# Patient Record
Sex: MALE | Race: BLACK OR AFRICAN AMERICAN | NOT HISPANIC OR LATINO | URBAN - METROPOLITAN AREA
[De-identification: names, ages, dates, MRNs, and addresses within clinical notes are randomized per-mention and may not be internally consistent; named-entity substitution may affect disease eponyms.]

---

## 2023-11-26 ENCOUNTER — EMERGENCY (EMERGENCY)
Facility: HOSPITAL | Age: 28
LOS: 1 days | Discharge: HOME | End: 2023-11-26
Attending: STUDENT IN AN ORGANIZED HEALTH CARE EDUCATION/TRAINING PROGRAM
Payer: COMMERCIAL

## 2023-11-26 VITALS
HEART RATE: 59 BPM | DIASTOLIC BLOOD PRESSURE: 85 MMHG | WEIGHT: 210.1 LBS | OXYGEN SATURATION: 99 % | HEIGHT: 70 IN | TEMPERATURE: 98 F | SYSTOLIC BLOOD PRESSURE: 121 MMHG | RESPIRATION RATE: 18 BRPM

## 2023-11-26 VITALS
HEART RATE: 72 BPM | OXYGEN SATURATION: 98 % | RESPIRATION RATE: 20 BRPM | DIASTOLIC BLOOD PRESSURE: 80 MMHG | SYSTOLIC BLOOD PRESSURE: 126 MMHG | TEMPERATURE: 98 F

## 2023-11-26 PROCEDURE — 26600 TREAT METACARPAL FRACTURE: CPT | Mod: LT

## 2023-11-26 PROCEDURE — 99284 EMERGENCY DEPT VISIT MOD MDM: CPT | Mod: 25

## 2023-11-26 PROCEDURE — 26641 TREAT THUMB DISLOCATION: CPT | Mod: LT

## 2023-11-26 PROCEDURE — 73110 X-RAY EXAM OF WRIST: CPT | Mod: 26,LT

## 2023-11-26 PROCEDURE — 73130 X-RAY EXAM OF HAND: CPT | Mod: 26,LT

## 2023-11-26 PROCEDURE — 73200 CT UPPER EXTREMITY W/O DYE: CPT | Mod: MA

## 2023-11-26 PROCEDURE — 99285 EMERGENCY DEPT VISIT HI MDM: CPT | Mod: 25

## 2023-11-26 PROCEDURE — 99284 EMERGENCY DEPT VISIT MOD MDM: CPT

## 2023-11-26 PROCEDURE — 99285 EMERGENCY DEPT VISIT HI MDM: CPT

## 2023-11-26 PROCEDURE — 73110 X-RAY EXAM OF WRIST: CPT

## 2023-11-26 PROCEDURE — 73130 X-RAY EXAM OF HAND: CPT

## 2023-11-26 PROCEDURE — 73200 CT UPPER EXTREMITY W/O DYE: CPT | Mod: 26,LT,MA

## 2023-11-26 RX ORDER — IBUPROFEN 200 MG
600 TABLET ORAL ONCE
Refills: 0 | Status: COMPLETED | OUTPATIENT
Start: 2023-11-26 | End: 2023-11-26

## 2023-11-26 RX ADMIN — Medication 600 MILLIGRAM(S): at 15:06

## 2023-11-26 NOTE — ED ADULT NURSE NOTE - NSFALLUNIVINTERV_ED_ALL_ED
Bed/Stretcher in lowest position, wheels locked, appropriate side rails in place/Call bell, personal items and telephone in reach/Instruct patient to call for assistance before getting out of bed/chair/stretcher/Non-slip footwear applied when patient is off stretcher/[place] to call system/Physically safe environment - no spills, clutter or unnecessary equipment/Purposeful proactive rounding/Room/bathroom lighting operational, light cord in reach

## 2023-11-26 NOTE — ED PROVIDER NOTE - PATIENT PORTAL LINK FT
You can access the FollowMyHealth Patient Portal offered by [facility] by registering at the following website: [email or web address]. By joining [employer]’s FollowMyHealth portal, you will also be able to view your health information using other applications (apps) compatible with our system.

## 2023-11-26 NOTE — ED PROVIDER NOTE - CARE PROVIDER_API CALL
Manuel Bills  Orthopaedic Surgery  [address] [place] [address]  Phone: [phone]  Fax: [phone]  Follow Up Time:

## 2023-11-26 NOTE — ED PROVIDER NOTE - NS ED ATTENDING STATEMENT MOD
This was a shared visit with the CHERIE. I reviewed and verified the documentation and independently performed the documented:

## 2023-11-26 NOTE — ED PROVIDER NOTE - OBJECTIVE STATEMENT
28-year-old male presents emergency department complaining of left hand pain status post fall onto the left hand yesterday.  Patient is right-handed.  Denies any other complaints of pain.

## 2023-11-26 NOTE — CONSULT NOTE ADULT - NS ATTEND AMEND GEN_ALL_CORE FT
Agree with PA note and plan as written.  Splint and closed reduction of thumb completed and ct reviewed. Patient to follow up as outpatient for definitive plan.

## 2023-11-26 NOTE — ED PROVIDER NOTE - CLINICAL SUMMARY MEDICAL DECISION MAKING FREE TEXT BOX
28-year-old male presents emergency room with left hand pain status post fall.  X-ray shows displaced hamate carpal bone fracture.  Orthopedics was consulted and fracture was reduced.  Patient was placed in splint by orthopedics.  Discussed with Ortho and patient is okay for discharge home with outpatient follow-up.  Importance of follow-up discussed with patient.

## 2023-11-26 NOTE — ED ADULT TRIAGE NOTE - CHIEF COMPLAINT QUOTE
pt bib [facility] for pain and swelling of L hand, pt fell on hand yesterday, pt able to move hand and fingers w/o difficulty.

## 2023-11-26 NOTE — CONSULT NOTE ADULT - SUBJECTIVE AND OBJECTIVE BOX
Pt Name: JOSEFINA AGUILERA    MRN: [id number]      Patient is a 28y Male presenting to the emergency department with a chief complaint of left hand pain. Patient seen at bedside. Patient reports falling and landing onto left hand. After fall patient endorsing left thumb pain. Reports pain and swelling, denies numbness or tingling. No other orthopedic complaints.       REVIEW OF SYSTEMS    General: Alert, responsive, in NAD    Skin: No rashes, no pruritis. + swelling to left first digit/hand.    Musculoskeletal: SEE HPI.    Neurological: No sensory or motor changes.         PAST MEDICAL & SURGICAL HISTORY:  PAST MEDICAL & SURGICAL HISTORY:      Allergies: No Known Allergies      Medications:     FAMILY HISTORY:  : non-contributory    Social History:     Ambulation: Walking independently [x] With Cane [ ] With Walker [ ]  Bedbound [ ]       Vital Signs Last 24 Hrs  T(C): 36.8 (26 Nov 2023 14:26), Max: 36.8 (26 Nov 2023 14:26)  T(F): 98.2 (26 Nov 2023 14:26), Max: 98.2 (26 Nov 2023 14:26)  HR: 59 (26 Nov 2023 14:26) (59 - 59)  BP: 121/85 (26 Nov 2023 14:26) (121/85 - 121/85)  BP(mean): --  RR: 18 (26 Nov 2023 14:26) (18 - 18)  SpO2: 99% (26 Nov 2023 14:26) (99% - 99%)    Parameters below as of 26 Nov 2023 14:26  Patient On (Oxygen Delivery Method): room air        Daily Height in cm: 177.8 (26 Nov 2023 14:26)    Daily       PHYSICAL EXAM:      Appearance: Alert, responsive, in no acute distress.    Neurological: Sensation is grossly intact to light touch. No focal deficits or weaknesses found.    Skin: no rash on visible skin. Skin is clean, dry and intact. No bleeding. No abrasions. No ulcerations. + swelling to left first digit/hand    Vascular: Cap refill < 2 sec. No signs of venous insufficiency or stasis. No extremity ulcerations. No cyanosis.    Musculoskeletal:         Left Upper Extremity: Clavicle: NTTP. Shoulder: NTTP, FROM. Abduction/adduction/flexion/extension intact. Elbow: NTTP, FROM. EE/EF intact. Wrist: NTTP, FROM. WE/WF intact. Hand: NTTP throughout, FROM. Abduction/adduction/flexion/extension of digits 1-5 intact. Compartments soft compressible. Sensation intact to light touch.        Right Upper Extremity: moving extremity freely on exam     Left Lower Extremity: moving extremity freely on exam     Right Lower Extremity: moving extremity freely on exam    Imaging Studies:  < from: Xray Wrist 3 Views, Left (11.26.23 @ 16:02) >  ACC: [id number] EXAM:  XR WRIST COMP MIN 3 VIEWS LT   ORDERED BY: MILLER FIELDS     ACC: [id number] EXAM:  XR HAND MIN 3 VIEWS LT   ORDERED BY: MILLER FIELDS     PROCEDURE DATE:  11/26/2023          INTERPRETATION:  Radiographs of the LEFT hand and wrist    CLINICAL INFORMATION:  LEFT hand and wrist  Pain.    TECHNIQUE:  Frontal, oblique and lateral views of the hand and wrist were   obtained.    FINDINGS:   No prior examinations are available for review.    Transverse displaced fracture through the distal end of hamate carpal   bone with malalignment of the radiocarpal joint space..  Remaining osseous and joint structures of the plate No subcutaneous air   or radiopaque foreign body.      IMPRESSION:   Displaced hamate carpal bone fracture with malalignment of   the radiocarpal joint space.    --- End of Report ---      MILLER PATEL MD; Attending Radiologist  This document has been electronically signed. Nov 26 2023  5:09PM    < end of copied text >      A/P:  Pt is a  28y Male with displaced hamate     PLAN:   - splint applied to LUE  - continue NWB Left hand  - f/u CT scan of left hand/wrist  - final plan pending image results  - d/w Dr. Oconnell    SPLINTING   PROCEDURE NOTE: Splinting    Performed by: Patricia North PA-C     Indication: fracture of hamate with 1st digit dislocation    The left 1st digit was appropriately positioned. A plaster splint was applied. Distally, the extremity was neurovascular intact following the procedure. The patient tolerated the procedure well. Pt Name: JOSEFINA AGUILERA    MRN: [id number]      Patient is a 28y Male presenting to the emergency department with a chief complaint of left hand pain. Patient seen at bedside. Patient reports falling and landing onto left hand. After fall patient endorsing left thumb pain. Reports pain and swelling, denies numbness or tingling. No other orthopedic complaints.       REVIEW OF SYSTEMS    General: Alert, responsive, in NAD    Skin: No rashes, no pruritis. + swelling to left first digit/hand.    Musculoskeletal: SEE HPI.    Neurological: No sensory or motor changes.         PAST MEDICAL & SURGICAL HISTORY:  PAST MEDICAL & SURGICAL HISTORY:      Allergies: No Known Allergies      Medications:     FAMILY HISTORY:  : non-contributory    Social History:     Ambulation: Walking independently [x] With Cane [ ] With Walker [ ]  Bedbound [ ]       Vital Signs Last 24 Hrs  T(C): 36.8 (26 Nov 2023 14:26), Max: 36.8 (26 Nov 2023 14:26)  T(F): 98.2 (26 Nov 2023 14:26), Max: 98.2 (26 Nov 2023 14:26)  HR: 59 (26 Nov 2023 14:26) (59 - 59)  BP: 121/85 (26 Nov 2023 14:26) (121/85 - 121/85)  BP(mean): --  RR: 18 (26 Nov 2023 14:26) (18 - 18)  SpO2: 99% (26 Nov 2023 14:26) (99% - 99%)    Parameters below as of 26 Nov 2023 14:26  Patient On (Oxygen Delivery Method): room air        Daily Height in cm: 177.8 (26 Nov 2023 14:26)    Daily       PHYSICAL EXAM:      Appearance: Alert, responsive, in no acute distress.    Neurological: Sensation is grossly intact to light touch. No focal deficits or weaknesses found.    Skin: no rash on visible skin. Skin is clean, dry and intact. No bleeding. No abrasions. No ulcerations. + swelling to left first digit/hand    Vascular: Cap refill < 2 sec. No signs of venous insufficiency or stasis. No extremity ulcerations. No cyanosis.    Musculoskeletal:         Left Upper Extremity: Clavicle: NTTP. Shoulder: NTTP, FROM. Abduction/adduction/flexion/extension intact. Elbow: NTTP, FROM. EE/EF intact. Wrist: NTTP, FROM. WE/WF intact. Hand: NTTP throughout, FROM. Abduction/adduction/flexion/extension of digits 1-5 intact. Compartments soft compressible. Sensation intact to light touch. Radial pulse 2+       Right Upper Extremity: moving extremity freely on exam     Left Lower Extremity: moving extremity freely on exam     Right Lower Extremity: moving extremity freely on exam    Imaging Studies:  < from: Xray Wrist 3 Views, Left (11.26.23 @ 16:02) >  ACC: [id number] EXAM:  XR WRIST COMP MIN 3 VIEWS LT   ORDERED BY: MILLER FIELDS     ACC: [id number] EXAM:  XR HAND MIN 3 VIEWS LT   ORDERED BY: MILLER FIELDS     PROCEDURE DATE:  11/26/2023          INTERPRETATION:  Radiographs of the LEFT hand and wrist    CLINICAL INFORMATION:  LEFT hand and wrist  Pain.    TECHNIQUE:  Frontal, oblique and lateral views of the hand and wrist were   obtained.    FINDINGS:   No prior examinations are available for review.    Transverse displaced fracture through the distal end of hamate carpal   bone with malalignment of the radiocarpal joint space..  Remaining osseous and joint structures of the plate No subcutaneous air   or radiopaque foreign body.      IMPRESSION:   Displaced hamate carpal bone fracture with malalignment of   the radiocarpal joint space.    --- End of Report ---      MILLER PATEL MD; Attending Radiologist  This document has been electronically signed. Nov 26 2023  5:09PM    < end of copied text >      A/P:  Pt is a  28y Male with dislocated first digit with base of first digit fracture    PLAN:   - splint applied to LUE  - continue NWB Left hand  - f/u CT scan of left hand/wrist  - final plan pending image results  - d/w Dr. Oconnell    SPLINTING   PROCEDURE NOTE: Splinting    Performed by: Patricia North PA-C     Indication: fracture of hamate with 1st digit dislocation    The left 1st digit was appropriately positioned. A plaster splint was applied. Distally, the extremity was neurovascular intact following the procedure. The patient tolerated the procedure well. Pt Name: JOSEFINA AGUILERA    MRN: [id number]      Patient is a 28y Male presenting to the emergency department with a chief complaint of left hand pain. Patient seen at bedside. Patient reports falling and landing onto left hand. After fall patient endorsing left thumb pain. Reports pain and swelling, denies numbness or tingling. No other orthopedic complaints.       REVIEW OF SYSTEMS    General: Alert, responsive, in NAD    Skin: No rashes, no pruritis. + swelling to left first digit/hand.    Musculoskeletal: SEE HPI.    Neurological: No sensory or motor changes.         PAST MEDICAL & SURGICAL HISTORY:  PAST MEDICAL & SURGICAL HISTORY:      Allergies: No Known Allergies      Medications:     FAMILY HISTORY:  : non-contributory    Social History:     Ambulation: Walking independently [x] With Cane [ ] With Walker [ ]  Bedbound [ ]       Vital Signs Last 24 Hrs  T(C): 36.8 (26 Nov 2023 14:26), Max: 36.8 (26 Nov 2023 14:26)  T(F): 98.2 (26 Nov 2023 14:26), Max: 98.2 (26 Nov 2023 14:26)  HR: 59 (26 Nov 2023 14:26) (59 - 59)  BP: 121/85 (26 Nov 2023 14:26) (121/85 - 121/85)  BP(mean): --  RR: 18 (26 Nov 2023 14:26) (18 - 18)  SpO2: 99% (26 Nov 2023 14:26) (99% - 99%)    Parameters below as of 26 Nov 2023 14:26  Patient On (Oxygen Delivery Method): room air        Daily Height in cm: 177.8 (26 Nov 2023 14:26)    Daily       PHYSICAL EXAM:      Appearance: Alert, responsive, in no acute distress.    Neurological: Sensation is grossly intact to light touch. No focal deficits or weaknesses found.    Skin: no rash on visible skin. Skin is clean, dry and intact. No bleeding. No abrasions. No ulcerations. + swelling to left first digit/hand    Vascular: Cap refill < 2 sec. No signs of venous insufficiency or stasis. No extremity ulcerations. No cyanosis.    Musculoskeletal:         Left Upper Extremity: Clavicle: NTTP. Shoulder: NTTP, FROM. Abduction/adduction/flexion/extension intact. Elbow: NTTP, FROM. EE/EF intact. Wrist: NTTP, FROM. WE/WF intact. Hand: NTTP throughout, FROM. Abduction/adduction/flexion/extension of digits 1-5 intact. Compartments soft compressible. Sensation intact to light touch. Radial pulse 2+       Right Upper Extremity: moving extremity freely on exam     Left Lower Extremity: moving extremity freely on exam     Right Lower Extremity: moving extremity freely on exam    Imaging Studies:  < from: Xray Wrist 3 Views, Left (11.26.23 @ 16:02) >  ACC: [id number] EXAM:  XR WRIST COMP MIN 3 VIEWS LT   ORDERED BY: MILLER FIELDS     ACC: [id number] EXAM:  XR HAND MIN 3 VIEWS LT   ORDERED BY: MILLER FIELDS     PROCEDURE DATE:  11/26/2023          INTERPRETATION:  Radiographs of the LEFT hand and wrist    CLINICAL INFORMATION:  LEFT hand and wrist  Pain.    TECHNIQUE:  Frontal, oblique and lateral views of the hand and wrist were   obtained.    FINDINGS:   No prior examinations are available for review.    Transverse displaced fracture through the distal end of hamate carpal   bone with malalignment of the radiocarpal joint space..  Remaining osseous and joint structures of the plate No subcutaneous air   or radiopaque foreign body.      IMPRESSION:   Displaced hamate carpal bone fracture with malalignment of   the radiocarpal joint space.    --- End of Report ---      MILLER PATEL MD; Attending Radiologist  This document has been electronically signed. Nov 26 2023  5:09PM    < end of copied text >      A/P:  Pt is a  28y Male with dislocated first digit with base of first digit fracture    PLAN:   - splint applied to LUE  - continue NWB Left hand  - f/u CT scan of left hand/wrist  - final plan pending image results  - d/w Dr. Oconnell    SPLINTING   PROCEDURE NOTE: Splinting    Performed by: Patricia North PA-C     Indication: fracture of hamate with 1st digit dislocation    The left 1st digit was appropriately positioned. A plaster splint was applied. Distally, the extremity was neurovascular intact following the procedure. The patient tolerated the procedure well.    ***ADDENDUM** 11/26/23 18:04  - CT reviewed by Dr. Oconnell  - f/u in the office within 1 week   - continue rest of care per ED team

## 2023-11-26 NOTE — ED PROVIDER NOTE - ATTENDING APP SHARED VISIT CONTRIBUTION OF CARE
pt under arrest. Pt fell on his L hand yesterday. today with L thumb pain. no other complaints.    physical- deformity to L prox thumb.    plan - XR with fx and likely prox metacarpal dislocation.  hand consult.

## 2023-12-05 PROBLEM — Z00.00 ENCOUNTER FOR PREVENTIVE HEALTH EXAMINATION: Status: ACTIVE | Noted: 2023-12-05

## 2023-12-07 ENCOUNTER — APPOINTMENT (OUTPATIENT)
Dept: ORTHOPEDIC SURGERY | Facility: CLINIC | Age: 28
End: 2023-12-07

## 2025-04-17 ENCOUNTER — TRANSCRIPTION ENCOUNTER (OUTPATIENT)
Age: 30
End: 2025-04-17